# Patient Record
Sex: MALE | Race: BLACK OR AFRICAN AMERICAN | NOT HISPANIC OR LATINO | URBAN - METROPOLITAN AREA
[De-identification: names, ages, dates, MRNs, and addresses within clinical notes are randomized per-mention and may not be internally consistent; named-entity substitution may affect disease eponyms.]

---

## 2019-11-16 ENCOUNTER — HOSPITAL ENCOUNTER (INPATIENT)
Facility: HOSPITAL | Age: 24
LOS: 1 days | Discharge: HOME | DRG: 177 | End: 2019-11-17
Attending: EMERGENCY MEDICINE | Admitting: EMERGENCY MEDICINE

## 2019-11-16 DIAGNOSIS — T40.601A OPIATE OVERDOSE, ACCIDENTAL OR UNINTENTIONAL, INITIAL ENCOUNTER: Primary | ICD-10-CM

## 2019-11-16 DIAGNOSIS — R36.9 PENILE DISCHARGE: ICD-10-CM

## 2019-11-16 DIAGNOSIS — J18.9 PNEUMONIA OF RIGHT LOWER LOBE DUE TO INFECTIOUS ORGANISM: ICD-10-CM

## 2019-11-16 DIAGNOSIS — R06.02 SHORTNESS OF BREATH: ICD-10-CM

## 2019-11-16 PROBLEM — J69.0 ASPIRATION PNEUMONITIS: Status: ACTIVE | Noted: 2019-11-16

## 2019-11-16 PROBLEM — I10 ESSENTIAL HYPERTENSION: Status: ACTIVE | Noted: 2019-11-16

## 2019-11-16 PROBLEM — J96.01 ACUTE RESPIRATORY FAILURE WITH HYPOXIA: Status: ACTIVE | Noted: 2019-11-16

## 2019-11-16 PROCEDURE — 83735 ASSAY OF MAGNESIUM: CPT

## 2019-11-16 PROCEDURE — 96372 THER/PROPH/DIAG INJ SC/IM: CPT

## 2019-11-16 PROCEDURE — 99283 PR EMERGENCY DEPT VISIT,LEVEL III: ICD-10-PCS | Mod: ,,, | Performed by: EMERGENCY MEDICINE

## 2019-11-16 PROCEDURE — 86703 HIV-1/HIV-2 1 RESULT ANTBDY: CPT

## 2019-11-16 PROCEDURE — 25000003 PHARM REV CODE 250: Performed by: EMERGENCY MEDICINE

## 2019-11-16 PROCEDURE — 84145 PROCALCITONIN (PCT): CPT

## 2019-11-16 PROCEDURE — 99285 EMERGENCY DEPT VISIT HI MDM: CPT | Mod: 25

## 2019-11-16 PROCEDURE — 99283 EMERGENCY DEPT VISIT LOW MDM: CPT | Mod: ,,, | Performed by: EMERGENCY MEDICINE

## 2019-11-16 PROCEDURE — 80053 COMPREHEN METABOLIC PANEL: CPT

## 2019-11-16 PROCEDURE — 63600175 PHARM REV CODE 636 W HCPCS: Performed by: EMERGENCY MEDICINE

## 2019-11-16 PROCEDURE — 12000002 HC ACUTE/MED SURGE SEMI-PRIVATE ROOM

## 2019-11-16 PROCEDURE — 84100 ASSAY OF PHOSPHORUS: CPT

## 2019-11-16 PROCEDURE — 80320 DRUG SCREEN QUANTALCOHOLS: CPT

## 2019-11-16 PROCEDURE — 86140 C-REACTIVE PROTEIN: CPT

## 2019-11-16 RX ORDER — CEFTRIAXONE 500 MG/1
500 INJECTION, POWDER, FOR SOLUTION INTRAMUSCULAR; INTRAVENOUS
Status: DISCONTINUED | OUTPATIENT
Start: 2019-11-16 | End: 2019-11-16

## 2019-11-16 RX ORDER — IBUPROFEN 200 MG
24 TABLET ORAL
Status: DISCONTINUED | OUTPATIENT
Start: 2019-11-17 | End: 2019-11-17 | Stop reason: HOSPADM

## 2019-11-16 RX ORDER — ACETAMINOPHEN 325 MG/1
650 TABLET ORAL EVERY 4 HOURS PRN
Status: DISCONTINUED | OUTPATIENT
Start: 2019-11-16 | End: 2019-11-17 | Stop reason: HOSPADM

## 2019-11-16 RX ORDER — ONDANSETRON 8 MG/1
8 TABLET, ORALLY DISINTEGRATING ORAL EVERY 8 HOURS PRN
Status: DISCONTINUED | OUTPATIENT
Start: 2019-11-16 | End: 2019-11-17 | Stop reason: HOSPADM

## 2019-11-16 RX ORDER — IBUPROFEN 200 MG
16 TABLET ORAL
Status: DISCONTINUED | OUTPATIENT
Start: 2019-11-17 | End: 2019-11-17 | Stop reason: HOSPADM

## 2019-11-16 RX ORDER — CEFTRIAXONE 500 MG/1
500 INJECTION, POWDER, FOR SOLUTION INTRAMUSCULAR; INTRAVENOUS
Status: COMPLETED | OUTPATIENT
Start: 2019-11-16 | End: 2019-11-16

## 2019-11-16 RX ORDER — AZITHROMYCIN 250 MG/1
1000 TABLET, FILM COATED ORAL
Status: COMPLETED | OUTPATIENT
Start: 2019-11-16 | End: 2019-11-16

## 2019-11-16 RX ORDER — GLUCAGON 1 MG
1 KIT INJECTION
Status: DISCONTINUED | OUTPATIENT
Start: 2019-11-17 | End: 2019-11-17 | Stop reason: HOSPADM

## 2019-11-16 RX ORDER — NALOXONE HYDROCHLORIDE 4 MG/.1ML
SPRAY NASAL
Qty: 1 EACH | Refills: 11 | Status: SHIPPED | OUTPATIENT
Start: 2019-11-16 | End: 2019-11-17 | Stop reason: HOSPADM

## 2019-11-16 RX ORDER — SODIUM CHLORIDE 0.9 % (FLUSH) 0.9 %
10 SYRINGE (ML) INJECTION
Status: DISCONTINUED | OUTPATIENT
Start: 2019-11-16 | End: 2019-11-17 | Stop reason: HOSPADM

## 2019-11-16 RX ADMIN — AZITHROMYCIN 1000 MG: 250 TABLET, FILM COATED ORAL at 09:11

## 2019-11-16 RX ADMIN — CEFTRIAXONE SODIUM 500 MG: 500 INJECTION, POWDER, FOR SOLUTION INTRAMUSCULAR; INTRAVENOUS at 09:11

## 2019-11-17 VITALS
RESPIRATION RATE: 17 BRPM | SYSTOLIC BLOOD PRESSURE: 128 MMHG | TEMPERATURE: 97 F | OXYGEN SATURATION: 96 % | HEART RATE: 84 BPM | WEIGHT: 200.19 LBS | BODY MASS INDEX: 26.53 KG/M2 | DIASTOLIC BLOOD PRESSURE: 73 MMHG | HEIGHT: 73 IN

## 2019-11-17 PROBLEM — J69.0 ASPIRATION PNEUMONITIS: Chronic | Status: ACTIVE | Noted: 2019-11-16

## 2019-11-17 LAB
ALBUMIN SERPL BCP-MCNC: 4.1 G/DL (ref 3.5–5.2)
ALP SERPL-CCNC: 55 U/L (ref 55–135)
ALT SERPL W/O P-5'-P-CCNC: 24 U/L (ref 10–44)
AMPHET+METHAMPHET UR QL: NEGATIVE
ANION GAP SERPL CALC-SCNC: 11 MMOL/L (ref 8–16)
AST SERPL-CCNC: 39 U/L (ref 10–40)
BACTERIA #/AREA URNS AUTO: NORMAL /HPF
BARBITURATES UR QL SCN>200 NG/ML: NEGATIVE
BENZODIAZ UR QL SCN>200 NG/ML: NEGATIVE
BILIRUB SERPL-MCNC: 0.5 MG/DL (ref 0.1–1)
BILIRUB UR QL STRIP: NEGATIVE
BUN SERPL-MCNC: 13 MG/DL (ref 6–20)
BZE UR QL SCN: NEGATIVE
CALCIUM SERPL-MCNC: 9.1 MG/DL (ref 8.7–10.5)
CANNABINOIDS UR QL SCN: NEGATIVE
CHLORIDE SERPL-SCNC: 103 MMOL/L (ref 95–110)
CLARITY UR REFRACT.AUTO: ABNORMAL
CO2 SERPL-SCNC: 23 MMOL/L (ref 23–29)
COLOR UR AUTO: YELLOW
CREAT SERPL-MCNC: 1.2 MG/DL (ref 0.5–1.4)
CREAT UR-MCNC: 103 MG/DL (ref 23–375)
CRP SERPL-MCNC: 0.4 MG/L (ref 0–8.2)
ERYTHROCYTE [SEDIMENTATION RATE] IN BLOOD BY WESTERGREN METHOD: NORMAL MM/HR (ref 0–23)
EST. GFR  (AFRICAN AMERICAN): >60 ML/MIN/1.73 M^2
EST. GFR  (NON AFRICAN AMERICAN): >60 ML/MIN/1.73 M^2
ETHANOL SERPL-MCNC: <10 MG/DL
ETHANOL UR-MCNC: <10 MG/DL
GLUCOSE SERPL-MCNC: 122 MG/DL (ref 70–110)
GLUCOSE UR QL STRIP: ABNORMAL
HGB UR QL STRIP: NEGATIVE
HYALINE CASTS UR QL AUTO: 1 /LPF
KETONES UR QL STRIP: NEGATIVE
LEUKOCYTE ESTERASE UR QL STRIP: NEGATIVE
MAGNESIUM SERPL-MCNC: 2.1 MG/DL (ref 1.6–2.6)
METHADONE UR QL SCN>300 NG/ML: NEGATIVE
MICROSCOPIC COMMENT: NORMAL
NITRITE UR QL STRIP: NEGATIVE
OPIATES UR QL SCN: NORMAL
PCP UR QL SCN>25 NG/ML: NORMAL
PH UR STRIP: 6 [PH] (ref 5–8)
PHOSPHATE SERPL-MCNC: 4.5 MG/DL (ref 2.7–4.5)
POTASSIUM SERPL-SCNC: 4.4 MMOL/L (ref 3.5–5.1)
PROCALCITONIN SERPL IA-MCNC: 0.15 NG/ML
PROT SERPL-MCNC: 7.6 G/DL (ref 6–8.4)
PROT UR QL STRIP: ABNORMAL
RBC #/AREA URNS AUTO: 1 /HPF (ref 0–4)
SODIUM SERPL-SCNC: 137 MMOL/L (ref 136–145)
SP GR UR STRIP: 1.01 (ref 1–1.03)
TOXICOLOGY INFORMATION: NORMAL
URN SPEC COLLECT METH UR: ABNORMAL
WBC #/AREA URNS AUTO: 2 /HPF (ref 0–5)
YEAST UR QL AUTO: NORMAL

## 2019-11-17 PROCEDURE — 87491 CHLMYD TRACH DNA AMP PROBE: CPT

## 2019-11-17 PROCEDURE — 80307 DRUG TEST PRSMV CHEM ANLYZR: CPT

## 2019-11-17 PROCEDURE — 63600175 PHARM REV CODE 636 W HCPCS: Performed by: HOSPITALIST

## 2019-11-17 PROCEDURE — 90686 IIV4 VACC NO PRSV 0.5 ML IM: CPT | Performed by: HOSPITALIST

## 2019-11-17 PROCEDURE — 81001 URINALYSIS AUTO W/SCOPE: CPT

## 2019-11-17 PROCEDURE — 99223 PR INITIAL HOSPITAL CARE,LEVL III: ICD-10-PCS | Mod: ,,, | Performed by: HOSPITALIST

## 2019-11-17 PROCEDURE — 99223 1ST HOSP IP/OBS HIGH 75: CPT | Mod: ,,, | Performed by: HOSPITALIST

## 2019-11-17 PROCEDURE — 90471 IMMUNIZATION ADMIN: CPT | Performed by: HOSPITALIST

## 2019-11-17 PROCEDURE — 94761 N-INVAS EAR/PLS OXIMETRY MLT: CPT

## 2019-11-17 PROCEDURE — 85652 RBC SED RATE AUTOMATED: CPT

## 2019-11-17 RX ORDER — AMOXICILLIN 500 MG/1
500 CAPSULE ORAL EVERY 8 HOURS
Qty: 15 CAPSULE | Refills: 0 | Status: SHIPPED | OUTPATIENT
Start: 2019-11-17

## 2019-11-17 RX ADMIN — INFLUENZA VIRUS VACCINE 0.5 ML: 15; 15; 15; 15 SUSPENSION INTRAMUSCULAR at 12:11

## 2019-11-17 NOTE — NURSING
Pt discharged. Reviewed d/c instructions. Questions were answered. Pt received a copy of d/c instructions.

## 2019-11-17 NOTE — ED PROVIDER NOTES
"Source of History:  Patient and EMS    Chief complaint:  Drug Overdose (found in field unresponsive. given 2.5 of Narcan. GCS 15 and reponds to voice at present.)      HPI:  Derek Scott is a 24 y.o. male presenting with opiate overdose.  The patient was found acting on a field and given Narcan.  He was also bagged until the Narcan for defect.  He receives to intranasal and 0.5 IV.  He admits to "doing some stuff that [he] saw some other dude do."  He currently feels cold.  He also notes some penile discharge.    ROS: As per HPI and below:  General: No fever.   Eyes: No visual changes.  Head: No headache.    Integument: No rashes or lesions.  Chest: No shortness of breath.  Cardiovascular: No chest pain.  Abdomen: No abdominal pain.  No nausea or vomiting.  Urinary: No abnormal urination.  Neurologic: No focal weakness.  No numbness.  Hematologic: No easy bruising.  Endocrine: No excessive thirst or urination.        Review of patient's allergies indicates:  No Known Allergies    No current facility-administered medications on file prior to encounter.      No current outpatient medications on file prior to encounter.       PMH:  As per HPI and below:  Past Medical History:   Diagnosis Date    Hypertension      History reviewed. No pertinent surgical history.    Social History     Socioeconomic History    Marital status: Single     Spouse name: Not on file    Number of children: Not on file    Years of education: Not on file    Highest education level: Not on file   Occupational History    Not on file   Social Needs    Financial resource strain: Not on file    Food insecurity:     Worry: Not on file     Inability: Not on file    Transportation needs:     Medical: Not on file     Non-medical: Not on file   Tobacco Use    Smoking status: Current Every Day Smoker     Packs/day: 0.50     Types: Cigarettes   Substance and Sexual Activity    Alcohol use: Yes     Comment: occasionally    Drug use: Yes     Types: " IV, Marijuana    Sexual activity: Not on file   Lifestyle    Physical activity:     Days per week: Not on file     Minutes per session: Not on file    Stress: Not on file   Relationships    Social connections:     Talks on phone: Not on file     Gets together: Not on file     Attends [employer] service: Not on file     Active member of club or organization: Not on file     Attends meetings of clubs or organizations: Not on file     Relationship status: Not on file   Other Topics Concern    Not on file   Social History Narrative    Not on file       Family History   Problem Relation Age of Onset    No Known Problems Sister        Physical Exam:    Vitals:    11/17/19 0430 11/17/19 0755 11/17/19 0838 11/17/19 1214   BP: 135/69 127/71  128/73   BP Location:    Left arm   Patient Position: Lying Lying  Lying   Pulse: 90 84 80 84   Resp: 18 17 18 17   Temp: 97 °F (36.1 °C) 97.4 °F (36.3 °C)     TempSrc: Oral Oral     SpO2: 95% 95% 98% 96%   Weight:       Height:         Appearance: No acute distress.  Skin: No rashes seen.  Good turgor.  No abrasions.  No ecchymoses.  Eyes: No conjunctival injection.  2 mm pupils, reactive.    ENT: Oropharynx clear.    Chest: Clear to auscultation bilaterally.  Good air movement.  No wheezes.  No rhonchi.  Cardiovascular: Regular rate and rhythm.  No murmurs. No gallops. No rubs.  Abdomen: Soft.  Not distended.  Nontender.  No guarding.  No rebound.  Musculoskeletal: Good range of motion all joints.  No deformities.  Neck supple.  No meningismus.  Neurologic: Motor intact.  Sensation intact.  Cerebellar intact.  Cranial nerves intact.  Mental Status:  Alert and oriented x 3.  Appropriate, conversant.      Initial Impression:  Opiate overdose, still somewhat under the effects of opiates.  Heel is awake and talking so I do not think it needs any more locks on.  I am going to treat his penile discharge with Zithromax and Rocephin.  We will test for GC chlamydia.  Will also check a  "chest x-ray.  Will observe until Narcan has worn off.    Laboratory Studies:  Labs Reviewed   COMPREHENSIVE METABOLIC PANEL - Abnormal; Notable for the following components:       Result Value    Glucose 122 (*)     All other components within normal limits   URINALYSIS, REFLEX TO URINE CULTURE - Abnormal; Notable for the following components:    Appearance, UA Hazy (*)     Protein, UA 1+ (*)     Glucose, UA 3+ (*)     All other components within normal limits    Narrative:     Preferred Collection Type->Urine, Clean Catch   C. TRACHOMATIS/N. GONORRHOEAE BY AMP DNA   MAGNESIUM   PHOSPHORUS   C-REACTIVE PROTEIN   PROCALCITONIN   TOXICOLOGY SCREEN, URINE, RANDOM (COMPLIANCE)    Narrative:     Preferred Collection Type->Urine, Clean Catch   ALCOHOL,MEDICAL (ETHANOL)   URINALYSIS MICROSCOPIC    Narrative:     Preferred Collection Type->Urine, Clean Catch   SEDIMENTATION RATE   CBC W/ AUTO DIFFERENTIAL   HIV 1 / 2 ANTIBODY         X-rays (independently interpreted by me): RLL infiltrate  Chart reviewed.     Imaging Results          X-Ray Chest AP Portable (Final result)  Result time 11/16/19 21:44:29    Final result by Pavel Peralta MD (11/16/19 21:44:29)                 Impression:      Low lung volumes with right greater than left bilateral airspace opacities.  Findings could reflect aspiration or pulmonary edema in the setting of opiate overdose.  Consider short-term follow-up as clinically appropriate.      Electronically signed by: Pavel Peralta MD  Date:    11/16/2019  Time:    21:44             Narrative:    EXAMINATION:  XR CHEST AP PORTABLE    CLINICAL HISTORY:  Provided history is "  Shortness of breath".    TECHNIQUE:  One view of the chest.    COMPARISON:  None.    FINDINGS:  Cardiac wires overlie the chest.  Patient is slightly rotated.  Lung volumes are low.  Right hemidiaphragm is mildly elevated.  Cardiac silhouette is not enlarged.  Patchy airspace opacities present in the right mid and lower lung " zone as well as to a lesser extent in the left lung base.  Left upper lung field is relatively clear.  No large pleural effusion.  No pneumothorax.                                Medications Given:  Medications   sodium chloride 0.9% flush 10 mL (has no administration in time range)   sodium chloride 0.9% flush 10 mL (has no administration in time range)   acetaminophen tablet 650 mg (has no administration in time range)   ondansetron disintegrating tablet 8 mg (has no administration in time range)   glucose chewable tablet 16 g (has no administration in time range)   glucose chewable tablet 24 g (has no administration in time range)   dextrose 10% (D10W) Bolus (has no administration in time range)   dextrose 10% (D10W) Bolus (has no administration in time range)   glucagon (human recombinant) injection 1 mg (has no administration in time range)   influenza (QUADRIVALENT PF) vaccine 0.5 mL (has no administration in time range)   cefTRIAXone injection 500 mg (500 mg Intramuscular Given 11/16/19 2150)   azithromycin tablet 1,000 mg (1,000 mg Oral Given 11/16/19 2148)       Discussed with: Hospital medicine    ED Course:       MDM:    24 y.o. male with opiate overdose requiring 2.5 of Narcan in the field.  He remained persistently hypoxic in the ED.  X-ray showed infiltrates, likely aspiration or related to opiate/Narcan use.  Will admit to Medicine to give antibiotics.  He also has penile discharge. GC/chlamydia have been covered by her choice of antibiotics.    Diagnostic Impression:    1. Opiate overdose, accidental or unintentional, initial encounter    2. Shortness of breath    3. Penile discharge    4. Pneumonia of right lower lobe due to infectious organism               Bertin Pastrana MD  11/17/19 1283

## 2019-11-17 NOTE — ASSESSMENT & PLAN NOTE
Patient unsure what his home medication is, does not take it consistently    - will need to address in the morning and restart if necessary

## 2019-11-17 NOTE — ED TRIAGE NOTES
Per Ems pt found unresponsive agonal pin point pupils, pt was given 2mg of Narcan IN, and 0.5 IV, per ems pt was responsive to narcan. Pt currently admits to doing heroin with friends, pt currently A&O x3 Slurred speech, pin point pupils +1.         Patient identifiers verified and correct for Derek Scott        LOC: The patient is awake, alert and aware of environment with an appropriate affect, the patient is oriented x 3 and speaking appropriately.   APPEARANCE: Patient appears comfortable and in no acute distress, patient is clean and well groomed.  SKIN: The skin is warm and dry, color consistent with ethnicity, patient has normal skin turgor and moist mucus membranes, skin intact, no breakdown or bruising noted.   MUSCULOSKELETAL: Patient moving all extremities spontaneously, no swelling noted.  RESPIRATORY: Airway is open and patent, respirations are spontaneous, patient has a labored effort and rate, no accessory muscle use noted, pt placed on continuous pulse ox with nasal cannula set @ 3L pt SPO2 @100%  CARDIAC: Pt placed on cardiac monitor. Patient has a normal rate and regular rhythm, no edema noted, capillary refill < 3 seconds.   GASTRO: Soft and non tender to palpation, no distention noted, normoactive bowel sounds present in all four quadrants. Pt states bowel movements have been regular.  : Pt denies any pain or frequency with urination.  NEURO: Pt opens eyes spontaneously, pt Pupils pin point +1, behavior appropriate to situation, follows commands, facial expression symmetrical, bilateral hand grasp equal and even, purposeful motor response noted, normal sensation in all extremities when touched with a finger.

## 2019-11-17 NOTE — H&P
[facility] Hospital Medicine  History & Physical    Patient Name: Derek Scott  MRN: [id number]  Admission Date: 11/16/2019  Attending Physician: Frantz Jorgensen, *   Primary Care Provider: No primary care provider on file.    Hospital Medicine Team: [facility] HOSP MED 4 Laura Douglas DO     Patient information was obtained from patient, EMS personnel, past medical records and ER records.     Subjective:     Principal Problem:Aspiration pneumonitis    Chief Complaint:   Chief Complaint   Patient presents with    Drug Overdose     found in field unresponsive. given 2.5 of Narcan. GCS 15 and reponds to voice at present.        HPI: Mr. Derek Scott is a 24 year old male with hypertension who presented to the ED on 11/16 for opioid overdose. He was then admitted to hospital medicine for hypoxia.     Mr. Scott reports that he was at a wedding, and decided to snort heroin for the first time when offered by one of the other guests at the wedding. He does not remember what happened afterward, but he was found to be obtunded by EMS when they were called. He was given Narcan with improvement in his mental status. The ED physician had intended to discharge him home, but then asked for admission to hospital medicine after he became hypoxic to 85% on room air. A CXR was obtained with evidence of RLL infiltrate. He was also treated for penile discharge.     At time of hospital medicine evaluation, the patient was saturating 100% on 2L via NC. He had no complaints other than sleepiness and generalized weakness. He denied any cough or fever, and felt well prior to heroin use. He reports recreational marijuana use and smokes about 1/3 ppd. He drinks occasionally. Denies any IVDU or previous heroin use.     Past Medical History:   Diagnosis Date    Hypertension        History reviewed. No pertinent surgical history.    Review of patient's allergies indicates:  No Known Allergies    No current  facility-administered medications on file prior to encounter.      No current outpatient medications on file prior to encounter.     Family History     Problem Relation (Age of Onset)    No Known Problems Sister        Tobacco Use    Smoking status: Current Every Day Smoker     Packs/day: 0.50     Types: Cigarettes   Substance and Sexual Activity    Alcohol use: Yes     Comment: occasionally    Drug use: Yes     Types: IV, Marijuana    Sexual activity: Not on file     Review of Systems   Constitutional: Positive for fatigue. Negative for chills, fever and unexpected weight change.   HENT: Negative for rhinorrhea and sore throat.    Eyes: Negative for pain and redness.   Respiratory: Negative for cough and shortness of breath.    Cardiovascular: Negative for chest pain and palpitations.   Gastrointestinal: Negative for abdominal pain, constipation, diarrhea, nausea and vomiting.   Endocrine: Negative for polydipsia and polyuria.   Genitourinary: Negative for dysuria and hematuria.   Musculoskeletal: Negative for back pain and neck pain.   Skin: Negative for color change and rash.   Neurological: Positive for weakness. Negative for light-headedness and headaches.   Hematological: Negative for adenopathy. Does not bruise/bleed easily.   Psychiatric/Behavioral: Negative for confusion. The patient is not nervous/anxious.      Objective:     Vital Signs (Most Recent):  Pulse: 90 (11/16/19 2041)  Resp: 14 (11/16/19 2041)  BP: (!) 132/96 (11/16/19 2041)  SpO2: (!) 85 % (11/16/19 2056) Vital Signs (24h Range):  Pulse:  [90] 90  Resp:  [14] 14  SpO2:  [85 %-98 %] 85 %  BP: (132)/(96) 132/96        There is no height or weight on file to calculate BMI.    Physical Exam   Constitutional: He is oriented to person, place, and time. He appears well-developed and well-nourished. No distress.   HENT:   Head: Normocephalic and atraumatic.   Mouth/Throat: Oropharynx is clear and moist.   Eyes: Conjunctivae and EOM are normal. No  scleral icterus.   Neck: Normal range of motion. Neck supple.   Cardiovascular: Normal rate, regular rhythm and normal heart sounds. Exam reveals no gallop and no friction rub.   No murmur heard.  Pulmonary/Chest: Effort normal. No respiratory distress. He has no wheezes.   Rhonchi RLL   Abdominal: Soft. Bowel sounds are normal. He exhibits no distension. There is no tenderness. There is no guarding.   Musculoskeletal: Normal range of motion. He exhibits no edema.   Lymphadenopathy:     He has no cervical adenopathy.   Neurological: He is alert and oriented to person, place, and time.   Slurred speech   Skin: Skin is warm and dry. No rash noted. He is not diaphoretic.   Psychiatric: He has a normal mood and affect. His behavior is normal.         CRANIAL NERVES     CN III, IV, VI   Extraocular motions are normal.        Significant Labs: none    Significant Imaging: I have reviewed all pertinent imaging results/findings within the past 24 hours.    Assessment/Plan:     * Aspiration pneumonitis  New hypoxia, RLL infiltrate, and RLL rhonchi after heroin overdose. Suspect aspiration pneumonitis without preceding infectious symptoms.     CXR: Patchy airspace opacities present in the right mid and lower lung zone as well as to a lesser extent in the left lung base    - supplemental oxygen as needed for O2 sat >92%  - will hold off on antibiotic therapy for now      Overdose of opiate or related narcotic, accidental or unintentional, initial encounter  S/P accidental overdose, first time user of intranasal heroin. Denies IVDU.     - urine tox screen ordered  - will watch for continued improvement in mental status.       Acute respiratory failure with hypoxia  As above      Essential hypertension  Patient unsure what his home medication is, does not take it consistently    - will need to address in the morning and restart if necessary      VTE Risk Mitigation (From admission, onward)         Ordered     IP VTE LOW RISK  PATIENT  Once      11/16/19 2300     Place sequential compression device  Until discontinued      11/16/19 2300                   Laura Douglas DO  Department of Hospital Medicine   [facility]

## 2019-11-17 NOTE — HPI
Mr. Derek Scott is a 24 year old male with hypertension who presented to the ED on 11/16 for opioid overdose. He was then admitted to hospital medicine for hypoxia.     Mr. Scott reports that he was at a wedding, and decided to snort heroin for the first time when offered by one of the other guests at the wedding. He does not remember what happened afterward, but he was found to be obtunded by EMS when they were called. He was given Narcan with improvement in his mental status. The ED physician had intended to discharge him home, but then asked for admission to hospital medicine after he became hypoxic to 85% on room air. A CXR was obtained with evidence of RLL infiltrate. He was also treated for penile discharge.     At time of hospital medicine evaluation, the patient was saturating 100% on 2L via NC. He had no complaints other than sleepiness and generalized weakness. He denied any cough or fever, and felt well prior to heroin use. He reports recreational marijuana use and smokes about 1/3 ppd. He drinks occasionally. Denies any IVDU or previous heroin use.

## 2019-11-17 NOTE — SUBJECTIVE & OBJECTIVE
Past Medical History:   Diagnosis Date    Hypertension        History reviewed. No pertinent surgical history.    Review of patient's allergies indicates:  No Known Allergies    No current facility-administered medications on file prior to encounter.      No current outpatient medications on file prior to encounter.     Family History     Problem Relation (Age of Onset)    No Known Problems Sister        Tobacco Use    Smoking status: Current Every Day Smoker     Packs/day: 0.50     Types: Cigarettes   Substance and Sexual Activity    Alcohol use: Yes     Comment: occasionally    Drug use: Yes     Types: IV, Marijuana    Sexual activity: Not on file     Review of Systems   Constitutional: Positive for fatigue. Negative for chills, fever and unexpected weight change.   HENT: Negative for rhinorrhea and sore throat.    Eyes: Negative for pain and redness.   Respiratory: Negative for cough and shortness of breath.    Cardiovascular: Negative for chest pain and palpitations.   Gastrointestinal: Negative for abdominal pain, constipation, diarrhea, nausea and vomiting.   Endocrine: Negative for polydipsia and polyuria.   Genitourinary: Negative for dysuria and hematuria.   Musculoskeletal: Negative for back pain and neck pain.   Skin: Negative for color change and rash.   Neurological: Positive for weakness. Negative for light-headedness and headaches.   Hematological: Negative for adenopathy. Does not bruise/bleed easily.   Psychiatric/Behavioral: Negative for confusion. The patient is not nervous/anxious.      Objective:     Vital Signs (Most Recent):  Pulse: 90 (11/16/19 2041)  Resp: 14 (11/16/19 2041)  BP: (!) 132/96 (11/16/19 2041)  SpO2: (!) 85 % (11/16/19 2056) Vital Signs (24h Range):  Pulse:  [90] 90  Resp:  [14] 14  SpO2:  [85 %-98 %] 85 %  BP: (132)/(96) 132/96        There is no height or weight on file to calculate BMI.    Physical Exam   Constitutional: He is oriented to person, place, and time. He  appears well-developed and well-nourished. No distress.   HENT:   Head: Normocephalic and atraumatic.   Mouth/Throat: Oropharynx is clear and moist.   Eyes: Conjunctivae and EOM are normal. No scleral icterus.   Neck: Normal range of motion. Neck supple.   Cardiovascular: Normal rate, regular rhythm and normal heart sounds. Exam reveals no gallop and no friction rub.   No murmur heard.  Pulmonary/Chest: Effort normal. No respiratory distress. He has no wheezes.   Rhonchi RLL   Abdominal: Soft. Bowel sounds are normal. He exhibits no distension. There is no tenderness. There is no guarding.   Musculoskeletal: Normal range of motion. He exhibits no edema.   Lymphadenopathy:     He has no cervical adenopathy.   Neurological: He is alert and oriented to person, place, and time.   Slurred speech   Skin: Skin is warm and dry. No rash noted. He is not diaphoretic.   Psychiatric: He has a normal mood and affect. His behavior is normal.         CRANIAL NERVES     CN III, IV, VI   Extraocular motions are normal.        Significant Labs: none    Significant Imaging: I have reviewed all pertinent imaging results/findings within the past 24 hours.

## 2019-11-17 NOTE — DISCHARGE SUMMARY
[facility] Hospital Medicine  Discharge Summary      Patient Name: Derek Scott  MRN: [id number]  Admission Date: 11/16/2019  Hospital Length of Stay: 1 days  Discharge Date and Time:  11/17/2019 1:48 PM  Attending Physician: Frantz Jorgensen, *   Discharging Provider: Jos Amador MD  Primary Care Provider: No primary care provider on file.  Hospital Medicine Team: [facility] [place] HOSP MED 4 Jos Amador MD    HPI:   Mr. Derek Scott is a 24 year old male with hypertension who presented to the ED on 11/16 for opioid overdose. He was then admitted to hospital medicine for hypoxia.     Mr. Scott reports that he was at a wedding, and decided to snort heroin for the first time when offered by one of the other guests at the wedding. He does not remember what happened afterward, but he was found to be obtunded by EMS when they were called. He was given Narcan with improvement in his mental status. The ED physician had intended to discharge him home, but then asked for admission to hospital medicine after he became hypoxic to 85% on room air. A CXR was obtained with evidence of RLL infiltrate. He was also treated for penile discharge.     At time of hospital medicine evaluation, the patient was saturating 100% on 2L via NC. He had no complaints other than sleepiness and generalized weakness. He denied any cough or fever, and felt well prior to heroin use. He reports recreational marijuana use and smokes about 1/3 ppd. He drinks occasionally. Denies any IVDU or previous heroin use.     * No surgery found *      Hospital Course:   Admitted for management of heroin overdose causing aspiration pneumonitis vs non-cardiogenic pulm edema. CXR concerning for RLL infiltrates. Monitored overnight patient did not require oxygen supp and satting >95% at rest. Complaints of being tired but clinically stable to be discharged as no clinical signs of worsening respiratory status or pneumonia. Advised patient  to get narcan prescription but patient refused saying he did snort heroin for the first time and is not going to do it again.. Also advised patient to look out for symptoms like cough, SOB, chest pain, fever, chills, increased sputum production and if that happens patient has been given a prescription of 5 days amoxicillin which he is advised to fill in once he notices symptom onset and report to the ER if symptoms not controlled or if he does not want to take the Abx prescribed.     Pro-primo negative  HIV pending, STD pending  Serum ethanol wnl  Urine drug screen - presumtive opioid and phencyclidine positive  CMP wnl.     Consults:     No new Assessment & Plan notes have been filed under this hospital service since the last note was generated.  Service: Hospital Medicine    Final Active Diagnoses:    Diagnosis Date Noted POA    PRINCIPAL PROBLEM:  Aspiration pneumonitis [J69.0] 11/16/2019 Unknown     Chronic    Overdose of opiate or related narcotic, accidental or unintentional, initial encounter [T40.601A] 11/16/2019 Yes    Acute respiratory failure with hypoxia [J96.01] 11/16/2019 Unknown    Essential hypertension [I10] 11/16/2019 Unknown      Problems Resolved During this Admission:       Discharged Condition: good    Disposition: Home or Self Care    Follow Up:    Patient Instructions:   No discharge procedures on file.    Significant Diagnostic Studies: Labs: All labs within the past 24 hours have been reviewed    Pending Diagnostic Studies:     Procedure Component Value Units Date/Time    HIV 1/2 Ag/Ab (4th Gen) [[id number]] Collected:  11/16/19 9728    Order Status:  Sent Lab Status:  In process Updated:  11/17/19 0023    Specimen:  Blood          Medications:  Reconciled Home Medications:      Medication List      START taking these medications    amoxicillin 500 MG capsule  Commonly known as:  AMOXIL  Take 1 capsule (500 mg total) by mouth every 8 (eight) hours.            Indwelling Lines/Drains at  time of discharge:   Lines/Drains/Airways     None                 Time spent on the discharge of patient: 38 minutes  Patient was seen and examined on the date of discharge and determined to be suitable for discharge.         Jos Amador MD  Department of Hospital Medicine  [facility]

## 2019-11-17 NOTE — PLAN OF CARE
Problem: Adult Inpatient Plan of Care  Goal: Plan of Care Review  Outcome: Ongoing, Progressing  Goal: Patient-Specific Goal (Individualization)  Outcome: Ongoing, Progressing  Goal: Absence of Hospital-Acquired Illness or Injury  Outcome: Ongoing, Progressing  Goal: Optimal Comfort and Wellbeing  Outcome: Ongoing, Progressing  Goal: Readiness for Transition of Care  Outcome: Ongoing, Progressing  Goal: Rounds/Family Conference  Outcome: Ongoing, Progressing     Problem: Fall Injury Risk  Goal: Absence of Fall and Fall-Related Injury  Outcome: Ongoing, Progressing

## 2019-11-17 NOTE — HOSPITAL COURSE
Admitted for management of heroin overdose causing aspiration pneumonitis vs non-cardiogenic pulm edema. CXR concerning for RLL infiltrates. Monitored overnight patient did not require oxygen supp and satting >95% at rest. Complaints of being tired but clinically stable to be discharged as no clinical signs of worsening respiratory status or pneumonia. Advised patient to get narcan prescription but patient refused saying he did snort heroin for the first time and is not going to do it again.. Also advised patient to look out for symptoms like cough, SOB, chest pain, fever, chills, increased sputum production and if that happens patient has been given a prescription of 5 days amoxicillin which he is advised to fill in once he notices symptom onset and report to the ER if symptoms not controlled or if he does not want to take the Abx prescribed.     Pro-primo negative  HIV pending, STD pending  Serum ethanol wnl  Urine drug screen - presumtive opioid and phencyclidine positive  CMP wnl.

## 2019-11-17 NOTE — ASSESSMENT & PLAN NOTE
New hypoxia, RLL infiltrate, and RLL rhonchi after heroin overdose. Suspect aspiration pneumonitis without preceding infectious symptoms.     CXR: Patchy airspace opacities present in the right mid and lower lung zone as well as to a lesser extent in the left lung base    - supplemental oxygen as needed for O2 sat >92%  - will hold off on antibiotic therapy for now

## 2019-11-17 NOTE — ASSESSMENT & PLAN NOTE
S/P accidental overdose, first time user of intranasal heroin. Denies IVDU.     - urine tox screen ordered  - will watch for continued improvement in mental status.

## 2019-11-18 LAB
C TRACH DNA SPEC QL NAA+PROBE: NOT DETECTED
HIV 1+2 AB+HIV1 P24 AG SERPL QL IA: NEGATIVE
N GONORRHOEA DNA SPEC QL NAA+PROBE: NOT DETECTED